# Patient Record
Sex: MALE | Race: WHITE | URBAN - METROPOLITAN AREA
[De-identification: names, ages, dates, MRNs, and addresses within clinical notes are randomized per-mention and may not be internally consistent; named-entity substitution may affect disease eponyms.]

---

## 2019-06-03 ENCOUNTER — HISTORICAL (OUTPATIENT)
Dept: ADMINISTRATIVE | Facility: HOSPITAL | Age: 24
End: 2019-06-03

## 2019-06-03 LAB
ABS NEUT (OLG): 2.6 X10(3)/MCL (ref 2.1–9.2)
ALBUMIN SERPL-MCNC: 4.6 GM/DL (ref 3.4–5)
ALBUMIN/GLOB SERPL: 2 {RATIO} (ref 1.5–2.5)
ALP SERPL-CCNC: 63 UNIT/L (ref 38–126)
ALT SERPL-CCNC: 12 UNIT/L (ref 7–52)
AST SERPL-CCNC: 15 UNIT/L (ref 15–37)
BILIRUB SERPL-MCNC: 0.4 MG/DL (ref 0.2–1)
BILIRUBIN DIRECT+TOT PNL SERPL-MCNC: 0.1 MG/DL (ref 0–0.5)
BILIRUBIN DIRECT+TOT PNL SERPL-MCNC: 0.3 MG/DL
BUN SERPL-MCNC: 10 MG/DL (ref 7–18)
CALCIUM SERPL-MCNC: 9 MG/DL (ref 8.5–10)
CHLORIDE SERPL-SCNC: 107 MMOL/L (ref 98–107)
CHOLEST SERPL-MCNC: 155 MG/DL (ref 0–200)
CHOLEST/HDLC SERPL: 3.2 {RATIO}
CO2 SERPL-SCNC: 28 MMOL/L (ref 21–32)
CREAT SERPL-MCNC: 0.83 MG/DL (ref 0.6–1.3)
ERYTHROCYTE [DISTWIDTH] IN BLOOD BY AUTOMATED COUNT: 12.2 % (ref 11.5–17)
GLOBULIN SER-MCNC: 2.3 GM/DL (ref 1.2–3)
GLUCOSE SERPL-MCNC: 103 MG/DL (ref 74–106)
HCT VFR BLD AUTO: 42.3 % (ref 42–52)
HDLC SERPL-MCNC: 49 MG/DL (ref 35–60)
HGB BLD-MCNC: 14.5 GM/DL (ref 14–18)
LDLC SERPL CALC-MCNC: 96 MG/DL (ref 0–129)
LYMPHOCYTES # BLD AUTO: 2.2 X10(3)/MCL (ref 0.6–3.4)
LYMPHOCYTES NFR BLD AUTO: 38.3 % (ref 13–40)
MCH RBC QN AUTO: 30.5 PG (ref 27–31.2)
MCHC RBC AUTO-ENTMCNC: 34 GM/DL (ref 32–36)
MCV RBC AUTO: 89 FL (ref 80–94)
MONOCYTES # BLD AUTO: 0.9 X10(3)/MCL (ref 0.1–1.3)
MONOCYTES NFR BLD AUTO: 16 % (ref 0.1–24)
NEUTROPHILS NFR BLD AUTO: 45.7 % (ref 47–80)
PLATELET # BLD AUTO: 207 X10(3)/MCL (ref 130–400)
PMV BLD AUTO: 9.9 FL (ref 9.4–12.4)
POTASSIUM SERPL-SCNC: 4.3 MMOL/L (ref 3.5–5.1)
PROT SERPL-MCNC: 6.9 GM/DL (ref 6.4–8.2)
RBC # BLD AUTO: 4.75 X10(6)/MCL (ref 4.7–6.1)
SODIUM SERPL-SCNC: 141 MMOL/L (ref 136–145)
TRIGL SERPL-MCNC: 20 MG/DL (ref 30–150)
TSH SERPL-ACNC: 1.02 MIU/ML (ref 0.35–4.94)
VLDLC SERPL CALC-MCNC: 4 MG/DL
WBC # SPEC AUTO: 5.7 X10(3)/MCL (ref 4.5–11.5)

## 2022-04-07 ENCOUNTER — HISTORICAL (OUTPATIENT)
Dept: ADMINISTRATIVE | Facility: HOSPITAL | Age: 27
End: 2022-04-07

## 2022-04-23 VITALS
OXYGEN SATURATION: 99 % | HEIGHT: 68 IN | SYSTOLIC BLOOD PRESSURE: 112 MMHG | DIASTOLIC BLOOD PRESSURE: 72 MMHG | BODY MASS INDEX: 22.22 KG/M2 | WEIGHT: 146.63 LBS

## 2022-05-02 NOTE — HISTORICAL [facility] CERNER
This is a historical note converted from [employer]. Formatting and pictures may have been removed.  Please reference [employer] for original formatting and attached multimedia. Chief Complaint  New patient -Wellness (fasting)  History of Present Illness  23?year old male presents for wellness visit.  Blood Pressure - 112/72  BMI - 22.29  Immunizations - Tdap due  Diet - average  Exercise - Skateboard 3 times weekly  Full-time student at [employer].? Also working part-time in construction.  Patient does admit to using marijuana daily.? Denies any other substance use.? Denies any current?concerns with use.  Review of Systems  Constitutional:?No weight loss, no fever, no fatigue, no chills, no night sweats,?no weakness  Eyes:?No blurred vision,?no redness,?no drainage,?no ocular?pain  HEENT:?No sore throat,?no ear pain, no sinus pressure, no nasal congestion, no rhinorrhea, no postnasal drip  Respiratory:?No cough, no wheezing, no sputum production, no shortness of breath  Cardiovascular:?No chest pain, no palpitations, no dyspnea on exertion,?no orthopnea  Gastrointestinal:?No nausea, no vomiting, no abdominal pain, no diarrhea,?no constipation, no melena,?no hematochezia  Genitourinary:?No dysuria, no hematuria, no frequency, no urgency, no incontinence, no discharge  Musculoskeletal:?No myalgias, no arthralgias, no weakness, no joint effusion, no edema  Integumentary:?No rashes, no hives, no itching, no lesions, no jaundice  Neurologic:?No headaches, no numbness, no tingling, no weakness, no dizziness  Psychiatric:?No anxiety, no irritability, no depression,?no suicidal ideations, no?homicidal ideations,?no delusions, no hallucinations  Endocrine:?No polyuria, no polydipsia, no polyphagia  Hematology:?No bruising, no lymphadenopathy,?no paleness  ?  Physical Exam  Vitals & Measurements  HR:?54(Peripheral)? BP:?112/72? SpO2:?99%?  HT:?172.72?cm? WT:?66.5?kg? BMI:?22.29?  General:?Well developed,  Well-nourished, in No acute distress, A&O x 4  Eye:?PERRLA, EOMI, Clear conjunctiva, Eyelids unremarkable  Ears:?Bilateral EAC clear?and?Bilateral TM clear  Nose:? Mucosa normal, No rhinorrhea, No lesions  O/P:??No?erythema,?No tonsillar hypertrophy,?No tonsillar exudates,?No cobblestoning  Neck:?Soft, Nontender, No thyromegaly, Full range of motion, No cervical lymphadenopathy, No JVD  Cardiovascular:?Regular rate and rhythm, No murmurs, No gallops, No rubs  Respiratory:?Clear to auscultation bilaterally, No wheezes, No rhonchi, No?crackles  Abdomen:? Soft, Nontender, No hepatosplenomegaly, Normal and equal bowel sounds, No masses, No rebound, No guarding  Musculoskeletal:? No tenderness, FROM, No joint abnormality, No clubbing, No cyanosis,No?edema  Neurologic:? No motor or sensory deficits, Reflexes 2+ throughout, CN II-XII grossly intact  Integumentary:?No rashes or skin lesions  ?  Assessment/Plan  1.?Wellness examination?Z00.00  ?Discussed the?importance of maintaining a balanced diet and regular exercise  Discussed concerns with?long-term marijuana use?at length  Ordered:  Preventative Health Care New 18-39 years 62016 , Wellness examination, [facility], 06/03/19 11:26:00 CDT  ?  2.?Immunization due?Z23  ?Tdap IM today  ?   Problem List/Past Medical History  Ongoing  No qualifying data  Historical  No qualifying data  Medications  No active medications  Allergies  penicillin?(Unknown)  Social History  Alcohol  Current, 1-2 times per week, 06/03/2019  Employment/School  Employed, Part time, 06/03/2019  Nutrition/Health  Type of diet: Average. Regular, 06/03/2019  Substance Abuse  Current, Marijuana, Daily, Started age 16 Years., 06/03/2019  Tobacco  Never (less than 100 in lifetime), No, 06/03/2019  Family History  Diabetes mellitus type 2: Father.  Hypertension: Father.  Mother: History is negative  Sister: History is negative  Brother: History is negative  Immunizations  Vaccine Date Status    tetanus/diphtheria/pertussis, acel(Tdap) 06/03/2019 Given   influenza virus vaccine, inactivated 09/23/2018 Recorded   influenza virus vaccine, inactivated 02/04/2014 Recorded   influenza virus vaccine, live, trivalent 12/04/2010 Recorded   tetanus/diphtheria/pertussis, acel(Tdap) 08/05/2008 Recorded   meningococcal conjugate vaccine 08/05/2008 Recorded   poliovirus vaccine, inactivated 09/05/2001 Recorded   measles/mumps/rubella virus vaccine 09/05/2001 Recorded   poliovirus vaccine, inactivated 01/09/1998 Recorded   measles/mumps/rubella virus vaccine 01/09/1998 Recorded   varicella virus vaccine 10/28/1997 Recorded   hepatitis B pediatric vaccine 04/03/1997 Recorded   poliovirus vaccine, inactivated 02/03/1997 Recorded   hepatitis B pediatric vaccine 1996 Recorded   poliovirus vaccine, inactivated 1996 Recorded   hepatitis B pediatric vaccine 1996 Recorded   Health Maintenance  Health Maintenance  ???Pending?(in the next year)  ??? ??OverDue  ??? ? ? ?Alcohol Misuse Screening due??01/01/19??and every 1??year(s)  ??? ??Due?  ??? ? ? ?ADL Screening due??06/03/19??and every 1??year(s)  ??? ??Due In Future?  ??? ? ? ?Obesity Screening not due until??01/01/20??and every 1??year(s)  ???Satisfied?(in the past 1 year)  ??? ??Satisfied?  ??? ? ? ?Blood Pressure Screening on??06/03/19.??Satisfied by Indu Azul CMA  ??? ? ? ?Body Mass Index Check on??06/03/19.??Satisfied by Indu Azul CMA  ??? ? ? ?Influenza Vaccine on??09/23/18.??Satisfied by Daniel Gregory Jr, MD  ??? ? ? ?Obesity Screening on??06/03/19.??Satisfied by Indu Azul CMA  ??? ? ? ?Tetanus Vaccine on??06/03/19.??Satisfied by nIdu Azul CMA  ?  ?